# Patient Record
Sex: MALE | Race: OTHER | HISPANIC OR LATINO | URBAN - METROPOLITAN AREA
[De-identification: names, ages, dates, MRNs, and addresses within clinical notes are randomized per-mention and may not be internally consistent; named-entity substitution may affect disease eponyms.]

---

## 2017-05-16 ENCOUNTER — EMERGENCY (EMERGENCY)
Facility: HOSPITAL | Age: 42
LOS: 1 days | Discharge: HOME | End: 2017-05-16
Attending: EMERGENCY MEDICINE
Payer: MEDICAID

## 2017-05-16 VITALS
HEIGHT: 69 IN | OXYGEN SATURATION: 99 % | DIASTOLIC BLOOD PRESSURE: 69 MMHG | SYSTOLIC BLOOD PRESSURE: 123 MMHG | RESPIRATION RATE: 20 BRPM | WEIGHT: 210.1 LBS | TEMPERATURE: 99 F | HEART RATE: 92 BPM

## 2017-05-16 DIAGNOSIS — M54.41 LUMBAGO WITH SCIATICA, RIGHT SIDE: ICD-10-CM

## 2017-05-16 DIAGNOSIS — G89.29 OTHER CHRONIC PAIN: ICD-10-CM

## 2017-05-16 PROCEDURE — 96372 THER/PROPH/DIAG INJ SC/IM: CPT

## 2017-05-16 PROCEDURE — 99283 EMERGENCY DEPT VISIT LOW MDM: CPT

## 2017-05-16 PROCEDURE — 99283 EMERGENCY DEPT VISIT LOW MDM: CPT | Mod: 25

## 2017-05-16 RX ORDER — METHOCARBAMOL 500 MG/1
1 TABLET, FILM COATED ORAL
Qty: 12 | Refills: 0 | OUTPATIENT
Start: 2017-05-16 | End: 2017-05-20

## 2017-05-16 RX ORDER — IBUPROFEN 200 MG
1 TABLET ORAL
Qty: 20 | Refills: 0 | OUTPATIENT
Start: 2017-05-16 | End: 2017-05-21

## 2017-05-16 RX ORDER — KETOROLAC TROMETHAMINE 30 MG/ML
30 SYRINGE (ML) INJECTION ONCE
Qty: 0 | Refills: 0 | Status: DISCONTINUED | OUTPATIENT
Start: 2017-05-16 | End: 2017-05-16

## 2017-05-16 RX ORDER — METHOCARBAMOL 500 MG/1
1500 TABLET, FILM COATED ORAL ONCE
Qty: 0 | Refills: 0 | Status: COMPLETED | OUTPATIENT
Start: 2017-05-16 | End: 2017-05-16

## 2017-05-16 RX ADMIN — Medication 30 MILLIGRAM(S): at 17:57

## 2017-05-16 RX ADMIN — METHOCARBAMOL 1500 MILLIGRAM(S): 500 TABLET, FILM COATED ORAL at 17:57

## 2017-05-16 RX ADMIN — Medication 30 MILLIGRAM(S): at 18:32

## 2017-05-16 NOTE — ED PROVIDER NOTE - PHYSICAL EXAMINATION
Back is symmetrical, scapulae are symmetric. Neck has full range of motion. No spinal or paraspinal tenderness, deformity or step-offs. All limbs equally strong 5/5 bilaterally. Strong ankle dorsiflexion and plantar flexion against resistance bilaterally. Strong flexion/extension of big toe bilaterally. Pt is able to walk in straight line with steady gait. No recent weight loss or night sweats. No saddle anesthesia, no bowel/bladder incontinence or retention, no lower extremity weakness.

## 2017-05-16 NOTE — ED PROVIDER NOTE - OBJECTIVE STATEMENT
42 year-old male, history of herniated discs in lower back, presents with cc back pain x 1 year. Gradual onset of lower back pain radiating to right buttock, back of thigh/calf and foot. Pain is continuous, worse with certain movements, no alleviating factors. Associated with intermittent tingling to right foot x 1 year. Has taken Lyrica and Gabapentin in DR to no relief. Denies saddle anesthesia, focal weakness, urinary/bowel dysfunction, recent injury, fever, weight loss or any other complaints.

## 2017-05-16 NOTE — ED PROVIDER NOTE - PROGRESS NOTE DETAILS
History and findings suggestive of radicular pain. No red flags for cord compression. Will need to follow up with ortho and PMD follow up through care coordinator. Will give Rx for IBU and Robaxin. Pt is well appearing walking with steady gait, stable for discharge and follow up without fail with medical doctor. I had a detailed discussion with the patient and/or guardian regarding the historical points, exam findings, and any diagnostic results supporting the discharge diagnosis. Pt educated on care and need for follow up. Strict return instructions and red flag signs and symptoms discussed with patient. Questions answered. Pt shows understanding of discharge information and agrees to follow.

## 2017-05-16 NOTE — ED PROVIDER NOTE - MEDICAL DECISION MAKING DETAILS
42 year-old male, presents with chronic back pain x 1 year. No red flags for cord compression or cauda equina. History and findings suggestive of radiculopathy. Will need to follow up with PMD and ortho.

## 2017-05-16 NOTE — ED PROVIDER NOTE - ATTENDING CONTRIBUTION TO CARE
chronic rt sided back pain radiating down leg worse with movement or change in position  no bowel/bladder symptoms  pos slr rt  radiculopathy based on symptoms  pain control, PMD f/u

## 2017-05-16 NOTE — ED ADULT NURSE NOTE - OBJECTIVE STATEMENT
AOX3 +ambulatory patient reports lower back pain x 1 year. Patient denies any new trauma no gi gu problems

## 2024-07-02 ENCOUNTER — EMERGENCY (EMERGENCY)
Facility: HOSPITAL | Age: 49
LOS: 1 days | Discharge: HOME | End: 2024-07-02
Attending: EMERGENCY MEDICINE
Payer: COMMERCIAL

## 2024-07-02 VITALS
OXYGEN SATURATION: 97 % | HEIGHT: 70.28 IN | DIASTOLIC BLOOD PRESSURE: 75 MMHG | TEMPERATURE: 98 F | RESPIRATION RATE: 18 BRPM | HEART RATE: 84 BPM | SYSTOLIC BLOOD PRESSURE: 124 MMHG | WEIGHT: 212.08 LBS

## 2024-07-02 PROCEDURE — 99284 EMERGENCY DEPT VISIT MOD MDM: CPT

## 2024-07-02 PROCEDURE — 99283 EMERGENCY DEPT VISIT LOW MDM: CPT

## 2024-07-02 RX ORDER — AMOXICILLIN/POTASSIUM CLAV 250-125 MG
875 TABLET ORAL
Qty: 14 | Refills: 0
Start: 2024-07-02 | End: 2024-07-08